# Patient Record
Sex: FEMALE
[De-identification: names, ages, dates, MRNs, and addresses within clinical notes are randomized per-mention and may not be internally consistent; named-entity substitution may affect disease eponyms.]

---

## 2017-05-22 ENCOUNTER — HOSPITAL (OUTPATIENT)
Dept: OTHER | Age: 42
End: 2017-05-22
Attending: EMERGENCY MEDICINE

## 2018-04-18 ENCOUNTER — HOSPITAL (OUTPATIENT)
Dept: OTHER | Age: 43
End: 2018-04-18

## 2018-04-18 LAB
ALBUMIN SERPL-MCNC: 3.5 GM/DL (ref 3.6–5.1)
ALBUMIN/GLOB SERPL: 0.9 {RATIO} (ref 1–2.4)
ALP SERPL-CCNC: 118 UNIT/L (ref 45–117)
ALT SERPL-CCNC: 116 UNIT/L
ANALYZER ANC (IANC): ABNORMAL
ANION GAP SERPL CALC-SCNC: 11 MMOL/L (ref 10–20)
AST SERPL-CCNC: 69 UNIT/L
BASOPHILS # BLD: 0 THOUSAND/MCL (ref 0–0.3)
BASOPHILS NFR BLD: 1 %
BILIRUB SERPL-MCNC: 0.4 MG/DL (ref 0.2–1)
BUN SERPL-MCNC: 10 MG/DL (ref 6–20)
BUN/CREAT SERPL: 17 (ref 7–25)
CALCIUM SERPL-MCNC: 8.7 MG/DL (ref 8.4–10.2)
CHLORIDE: 101 MMOL/L (ref 98–107)
CO2 SERPL-SCNC: 27 MMOL/L (ref 21–32)
CREAT SERPL-MCNC: 0.6 MG/DL (ref 0.51–0.95)
DIFFERENTIAL METHOD BLD: ABNORMAL
EOSINOPHIL # BLD: 0.2 THOUSAND/MCL (ref 0.1–0.5)
EOSINOPHIL NFR BLD: 3 %
ERYTHROCYTE [DISTWIDTH] IN BLOOD: 20.1 % (ref 11–15)
GLOBULIN SER-MCNC: 4.1 GM/DL (ref 2–4)
GLUCOSE SERPL-MCNC: 122 MG/DL (ref 65–99)
HEMATOCRIT: 40.1 % (ref 36–46.5)
HGB BLD-MCNC: 12.3 GM/DL (ref 12–15.5)
IMM GRANULOCYTES # BLD AUTO: 0 THOUSAND/MCL (ref 0–0.2)
IMM GRANULOCYTES NFR BLD: 0 %
LIPASE SERPL-CCNC: 97 UNIT/L (ref 73–393)
LYMPHOCYTES # BLD: 1.3 THOUSAND/MCL (ref 1–4.8)
LYMPHOCYTES NFR BLD: 20 %
MCH RBC QN AUTO: 22.1 PG (ref 26–34)
MCHC RBC AUTO-ENTMCNC: 30.7 GM/DL (ref 32–36.5)
MCV RBC AUTO: 72.1 FL (ref 78–100)
MONOCYTES # BLD: 0.5 THOUSAND/MCL (ref 0.3–0.9)
MONOCYTES NFR BLD: 8 %
NEUTROPHILS # BLD: 4.4 THOUSAND/MCL (ref 1.8–7.7)
NEUTROPHILS NFR BLD: 68 %
NEUTS SEG NFR BLD: ABNORMAL %
PERCENT NRBC: 0 %
PLATELET # BLD: 231 THOUSAND/MCL (ref 140–450)
POTASSIUM SERPL-SCNC: 3.1 MMOL/L (ref 3.4–5.1)
PROT SERPL-MCNC: 7.6 GM/DL (ref 6.4–8.2)
RBC # BLD: 5.56 MILLION/MCL (ref 4–5.2)
SODIUM SERPL-SCNC: 136 MMOL/L (ref 135–145)
WBC # BLD: 6.5 THOUSAND/MCL (ref 4.2–11)

## 2018-04-19 LAB
APPEARANCE UR: ABNORMAL
BACTERIA #/AREA URNS HPF: ABNORMAL /HPF
BILIRUB UR QL: NEGATIVE
COLOR UR: YELLOW
GLUCOSE UR-MCNC: NEGATIVE MG/DL
HCG POINT OF CARE (5HGRST): NEGATIVE
HGB UR QL: NEGATIVE
HYALINE CASTS #/AREA URNS LPF: ABNORMAL /LPF (ref 0–5)
KETONES UR-MCNC: NEGATIVE MG/DL
LEUKOCYTE ESTERASE UR QL STRIP: ABNORMAL
MICROSCOPIC (MT): ABNORMAL
NITRITE UR QL: NEGATIVE
PH UR: 6.5 UNIT (ref 5–7)
PROT UR QL: NEGATIVE MG/DL
RBC #/AREA URNS HPF: ABNORMAL /HPF (ref 0–3)
SP GR UR: 1.02 (ref 1–1.03)
SPECIMEN SOURCE: ABNORMAL
SQUAMOUS #/AREA URNS HPF: ABNORMAL /HPF (ref 0–5)
URNS CMNT MICRO: ABNORMAL
UROBILINOGEN UR QL: 1 MG/DL (ref 0–1)
WBC #/AREA URNS HPF: ABNORMAL /HPF (ref 0–5)

## 2018-05-17 ENCOUNTER — CHARTING TRANS (OUTPATIENT)
Dept: OTHER | Age: 43
End: 2018-05-17

## 2018-05-17 ASSESSMENT — PAIN SCALES - GENERAL: PAINLEVEL_OUTOF10: 0

## 2018-05-19 ENCOUNTER — HOSPITAL (OUTPATIENT)
Dept: OTHER | Age: 43
End: 2018-05-19
Attending: SURGERY

## 2018-11-01 VITALS
SYSTOLIC BLOOD PRESSURE: 115 MMHG | HEART RATE: 78 BPM | HEIGHT: 61 IN | DIASTOLIC BLOOD PRESSURE: 81 MMHG | BODY MASS INDEX: 34.55 KG/M2 | WEIGHT: 183 LBS

## 2022-04-01 ENCOUNTER — HOSPITAL ENCOUNTER (EMERGENCY)
Age: 47
Discharge: HOME | End: 2022-04-01
Attending: EMERGENCY MEDICINE
Payer: COMMERCIAL

## 2022-04-01 VITALS
DIASTOLIC BLOOD PRESSURE: 67 MMHG | RESPIRATION RATE: 18 BRPM | HEIGHT: 61 IN | OXYGEN SATURATION: 96 % | BODY MASS INDEX: 32.1 KG/M2 | SYSTOLIC BLOOD PRESSURE: 114 MMHG | HEART RATE: 86 BPM | WEIGHT: 170 LBS | TEMPERATURE: 98 F

## 2022-04-01 DIAGNOSIS — H00.025 HORDEOLUM INTERNUM OF LEFT LOWER EYELID: Primary | ICD-10-CM

## 2022-04-01 PROCEDURE — 99283 EMERGENCY DEPT VISIT LOW MDM: CPT

## 2022-04-01 RX ORDER — OMEPRAZOLE 20 MG/1
20 CAPSULE, DELAYED RELEASE ORAL
COMMUNITY

## 2022-04-01 RX ORDER — LISINOPRIL AND HYDROCHLOROTHIAZIDE 25; 20 MG/1; MG/1
1 TABLET ORAL DAILY
COMMUNITY

## 2022-04-01 RX ORDER — ERYTHROMYCIN 5 MG/G
1 OINTMENT OPHTHALMIC EVERY 6 HOURS
Qty: 1 G | Refills: 0 | Status: SHIPPED | OUTPATIENT
Start: 2022-04-01 | End: 2022-04-08

## 2022-04-01 NOTE — ED PROVIDER NOTES
I reviewed that chart and discussed the case. I have examined the patient and noted 51-year-old female that complains of bump and drainage to left lower eyelid. Is been present for 2 days. This morning when she woke up there is purulent drainage. No fevers or chills. No visual symptoms. She does not wear contacts. HEENT: Left lower eyelid is slightly edematous. No evidence of erythema or drainage currently. Discharge or lesion with Hordeolum internum  and not for follow-up. Patient discharged home in good condition. I agree with the following clinical impression(s):  Hordeolum internum of left lower eyelid  (primary encounter diagnosis). I agree with the plan as noted.

## 2022-04-01 NOTE — ED INITIAL ASSESSMENT (HPI)
Pt reports lt eye pain, drainage, swelling, headache/facial pain and \"bump\" to eyelid for 3 days with blurry vision to lt eye.

## 2024-12-03 NOTE — ED INITIAL ASSESSMENT (HPI)
Fell down on stairs last night fell backward hit her rani on the stairs . There is mild bruises  on left side of the chest . Complaining of pain in left chest and left knee area . Patient is ambulatory no  shortness of breath . Did not hit her head . Not on any blood thinners

## 2024-12-03 NOTE — ED PROVIDER NOTES
Patient Seen in: [facility] Emergency Department      History     Chief Complaint   Patient presents with    Fall     Stated Complaint: fell yesterday, knee pain and chest wall pain    Subjective:   HPI      49-year-old female complaining of chest pain the patient was helping her mother up some stairs and lost her balance and fell and she struck her chest on the edge of the couch and has pain in the left mid chest as well as over the sternum and also pain the left knee there is no loss of conscious no fainting she denies any neck pain or head injury.    Objective:     Past Medical History:    Diabetes (HCC)    Esophageal reflux    Essential hypertension              History reviewed. No pertinent surgical history.             Social History     Socioeconomic History    Marital status:    Tobacco Use    Smoking status: Some Days    Smokeless tobacco: Never   Vaping Use    Vaping status: Never Used   Substance and Sexual Activity    Alcohol use: Yes     Comment: sometimes    Drug use: Never     Social Drivers of Health      Received from [facility]                  Physical Exam     ED Triage Vitals [12/03/24 1223]   BP (!) 156/100   Pulse 83   Resp 18   Temp 98.7 °F (37.1 °C)   Temp src Temporal   SpO2 96 %   O2 Device None (Room air)       Current Vitals:   Vital Signs  BP: (!) 156/100  Pulse: 83  Resp: 18  Temp: 98.7 °F (37.1 °C)  Temp src: Temporal    Oxygen Therapy  SpO2: 96 %  O2 Device: None (Room air)        Physical Exam  Patient is alert orient x 3 no acute distress HEENT exam is atraumatic the neck is nontender backs nontender lungs are clear cardiovascular exam shows regular rate and rhythm without murmurs abdomen soft nontender to the chest wall there is some moderate tenderness with some ecchymosis over the mid sternum in the left parasternal area.  The left knee there is moderate swelling over the anterior aspect of the knee patient can do a straight leg raise the  ligaments are grossly intact neurovascular is intact distally.  Mental status alert and oriented ×3  Cranial nerves II through XII within normal limits  Motor function is intact in all 4 extremities  Sensation is intact in all 4 extremities  Cerebellar finger-nose and heel-to-shin are normal  Deep tendon reflexes are normal    ED Course   Labs Reviewed - No data to display         XR KNEE (3 VIEWS), LEFT (CPT=73562)    Result Date: 12/3/2024  CONCLUSION:  1. Osseous structures are intact.   LOCATION:  [facility]   Dictated by (CST): Elizabeth Ruelas MD on 12/03/2024 at 1:57 PM     Finalized by (CST): Elizabeth Ruelas MD on 12/03/2024 at 1:58 PM       XR STERNUM (MIN 2 VIEWS) (CPT=71120)    Result Date: 12/3/2024  CONCLUSION:  1. Limited exam.  No acute depressed fracture is appreciated.  If there is high clinical suspicion for a fracture, recommend dedicated CT for further evaluation.   LOCATION:  [facility]   Dictated by (CST): Elizabeth Ruelas MD on 12/03/2024 at 1:49 PM     Finalized by (CST): Elizabeth Ruelas MD on 12/03/2024 at 1:51 PM       XR CHEST PA + LAT CHEST (CPT=71046)    Result Date: 12/3/2024  CONCLUSION:  No focal consolidation.  Continued clinical correlation recommended.   LOCATION:  [facility]   Dictated by (CST): Elizabeth Ruelas MD on 12/03/2024 at 1:48 PM     Finalized by (CST): Elizabeth Ruelas MD on 12/03/2024 at 1:49 PM      Images independent reviewed there is no fracture       MDM      Initial differential diagnosis includes fracture contusion ligamentous injury rib fracture        Medical Decision Making  Patient has soft tissue contusion in the chest and knee advised Tylenol Advil symptomatic treatment return any problems.    Disposition and Plan     Clinical Impression:  No diagnosis found.     Disposition:  There is no disposition on file for this visit.  There is no disposition time on file for this visit.    Follow-up:  No follow-up provider specified.        Medications Prescribed:  Current Discharge Medication List               Supplementary Documentation:

## 2024-12-03 NOTE — DISCHARGE INSTRUCTIONS
Tylenol or Advil as needed apply ice 20 minutes 4 times a day follow-up your doctor in a few days if not better return if worse.

## (undated) NOTE — LETTER
December 3, 2024    Patient: Nadia Campbell   Date of Visit: 12/3/2024       To Whom It May Concern:    Nadia Campbell was seen and treated in our emergency department on 12/3/2024. She should not return to work until December 6 May return sooner if better .    If you have any questions or concerns, please don't hesitate to call.       Encounter Provider(s):    David Roach MD